# Patient Record
Sex: MALE | Race: BLACK OR AFRICAN AMERICAN | URBAN - METROPOLITAN AREA
[De-identification: names, ages, dates, MRNs, and addresses within clinical notes are randomized per-mention and may not be internally consistent; named-entity substitution may affect disease eponyms.]

---

## 2019-09-16 ENCOUNTER — ED HISTORICAL/CONVERTED ENCOUNTER (OUTPATIENT)
Dept: OTHER | Age: 64
End: 2019-09-16

## 2020-05-27 ENCOUNTER — OP HISTORICAL/CONVERTED ENCOUNTER (OUTPATIENT)
Dept: OTHER | Age: 65
End: 2020-05-27

## 2020-06-08 ENCOUNTER — OP HISTORICAL/CONVERTED ENCOUNTER (OUTPATIENT)
Dept: OTHER | Age: 65
End: 2020-06-08

## 2021-12-16 ENCOUNTER — HOSPITAL ENCOUNTER (EMERGENCY)
Age: 66
Discharge: HOME | End: 2021-12-16
Attending: FAMILY MEDICINE
Payer: MEDICARE

## 2021-12-16 ENCOUNTER — APPOINTMENT (OUTPATIENT)
Dept: CT IMAGING | Age: 66
End: 2021-12-16
Attending: FAMILY MEDICINE
Payer: MEDICARE

## 2021-12-16 VITALS
TEMPERATURE: 98.1 F | BODY MASS INDEX: 27.09 KG/M2 | WEIGHT: 200 LBS | HEART RATE: 77 BPM | OXYGEN SATURATION: 99 % | RESPIRATION RATE: 19 BRPM | SYSTOLIC BLOOD PRESSURE: 160 MMHG | DIASTOLIC BLOOD PRESSURE: 102 MMHG | HEIGHT: 72 IN

## 2021-12-16 DIAGNOSIS — R42 VERTIGO: Primary | ICD-10-CM

## 2021-12-16 LAB
ALBUMIN SERPL-MCNC: 3.3 G/DL (ref 3.5–5)
ALBUMIN/GLOB SERPL: 0.7 {RATIO} (ref 1.1–2.2)
ALP SERPL-CCNC: 78 U/L (ref 45–117)
ALT SERPL-CCNC: 19 U/L (ref 12–78)
ANION GAP SERPL CALC-SCNC: 7 MMOL/L (ref 5–15)
AST SERPL W P-5'-P-CCNC: 16 U/L (ref 15–37)
BASOPHILS # BLD: 0.1 K/UL (ref 0–0.1)
BASOPHILS NFR BLD: 1 % (ref 0–1)
BILIRUB SERPL-MCNC: 0.3 MG/DL (ref 0.2–1)
BUN SERPL-MCNC: 18 MG/DL (ref 6–20)
BUN/CREAT SERPL: 17 (ref 12–20)
CA-I BLD-MCNC: 9.2 MG/DL (ref 8.5–10.1)
CHLORIDE SERPL-SCNC: 103 MMOL/L (ref 97–108)
CO2 SERPL-SCNC: 28 MMOL/L (ref 21–32)
CREAT SERPL-MCNC: 1.06 MG/DL (ref 0.7–1.3)
DIFFERENTIAL METHOD BLD: NORMAL
EOSINOPHIL # BLD: 0.1 K/UL (ref 0–0.4)
EOSINOPHIL NFR BLD: 2 % (ref 0–7)
ERYTHROCYTE [DISTWIDTH] IN BLOOD BY AUTOMATED COUNT: 12.7 % (ref 11.5–14.5)
GLOBULIN SER CALC-MCNC: 4.9 G/DL (ref 2–4)
GLUCOSE SERPL-MCNC: 95 MG/DL (ref 65–100)
HCT VFR BLD AUTO: 39.6 % (ref 36.6–50.3)
HGB BLD-MCNC: 13.5 G/DL (ref 12.1–17)
IMM GRANULOCYTES # BLD AUTO: 0 K/UL (ref 0–0.04)
IMM GRANULOCYTES NFR BLD AUTO: 0 % (ref 0–0.5)
LYMPHOCYTES # BLD: 1.6 K/UL (ref 0.8–3.5)
LYMPHOCYTES NFR BLD: 28 % (ref 12–49)
MCH RBC QN AUTO: 31 PG (ref 26–34)
MCHC RBC AUTO-ENTMCNC: 34.1 G/DL (ref 30–36.5)
MCV RBC AUTO: 91 FL (ref 80–99)
MONOCYTES # BLD: 0.5 K/UL (ref 0–1)
MONOCYTES NFR BLD: 9 % (ref 5–13)
NEUTS SEG # BLD: 3.5 K/UL (ref 1.8–8)
NEUTS SEG NFR BLD: 60 % (ref 32–75)
PLATELET # BLD AUTO: 247 K/UL (ref 150–400)
PMV BLD AUTO: 9.5 FL (ref 8.9–12.9)
POTASSIUM SERPL-SCNC: 3.8 MMOL/L (ref 3.5–5.1)
PROT SERPL-MCNC: 8.2 G/DL (ref 6.4–8.2)
RBC # BLD AUTO: 4.35 M/UL (ref 4.1–5.7)
SODIUM SERPL-SCNC: 138 MMOL/L (ref 136–145)
WBC # BLD AUTO: 5.9 K/UL (ref 4.1–11.1)

## 2021-12-16 PROCEDURE — 36415 COLL VENOUS BLD VENIPUNCTURE: CPT

## 2021-12-16 PROCEDURE — 80053 COMPREHEN METABOLIC PANEL: CPT

## 2021-12-16 PROCEDURE — 85025 COMPLETE CBC W/AUTO DIFF WBC: CPT

## 2021-12-16 PROCEDURE — 99284 EMERGENCY DEPT VISIT MOD MDM: CPT

## 2021-12-16 PROCEDURE — 74011250636 HC RX REV CODE- 250/636: Performed by: FAMILY MEDICINE

## 2021-12-16 PROCEDURE — 74011000636 HC RX REV CODE- 636: Performed by: FAMILY MEDICINE

## 2021-12-16 PROCEDURE — 70496 CT ANGIOGRAPHY HEAD: CPT

## 2021-12-16 PROCEDURE — 93005 ELECTROCARDIOGRAM TRACING: CPT

## 2021-12-16 RX ORDER — MECLIZINE HYDROCHLORIDE 25 MG/1
25 TABLET ORAL 2 TIMES DAILY
Qty: 10 TABLET | Refills: 0 | Status: SHIPPED | OUTPATIENT
Start: 2021-12-16 | End: 2021-12-21

## 2021-12-16 RX ORDER — MECLIZINE HCL 12.5 MG 12.5 MG/1
25 TABLET ORAL
Status: COMPLETED | OUTPATIENT
Start: 2021-12-16 | End: 2021-12-16

## 2021-12-16 RX ADMIN — IOPAMIDOL 100 ML: 755 INJECTION, SOLUTION INTRAVENOUS at 17:42

## 2021-12-16 RX ADMIN — MECLIZINE 25 MG: 12.5 TABLET ORAL at 17:14

## 2021-12-16 NOTE — ED TRIAGE NOTES
Pt has had dizziness for 2 days with nausea that started today. Pt took garlic and tylenol this morning. Pt states he has issues with his sinuses that sometimes causes dizziness, states he has some sinus pressure.

## 2021-12-16 NOTE — DISCHARGE INSTRUCTIONS
Thank you! Thank you for allowing me to care for you in the emergency department. I sincerely hope that you are satisfied with your visit today. It is my goal to provide you with excellent care. Below you will find a list of your labs and imaging from your visit today. Should you have any questions regarding these results please do not hesitate to call the emergency department. Labs -     Recent Results (from the past 12 hour(s))   CBC WITH AUTOMATED DIFF    Collection Time: 12/16/21  5:00 PM   Result Value Ref Range    WBC 5.9 4.1 - 11.1 K/uL    RBC 4.35 4.10 - 5.70 M/uL    HGB 13.5 12.1 - 17.0 g/dL    HCT 39.6 36.6 - 50.3 %    MCV 91.0 80.0 - 99.0 FL    MCH 31.0 26.0 - 34.0 PG    MCHC 34.1 30.0 - 36.5 g/dL    RDW 12.7 11.5 - 14.5 %    PLATELET 954 503 - 345 K/uL    MPV 9.5 8.9 - 12.9 FL    NEUTROPHILS 60 32 - 75 %    LYMPHOCYTES 28 12 - 49 %    MONOCYTES 9 5 - 13 %    EOSINOPHILS 2 0 - 7 %    BASOPHILS 1 0 - 1 %    IMMATURE GRANULOCYTES 0 0.0 - 0.5 %    ABS. NEUTROPHILS 3.5 1.8 - 8.0 K/UL    ABS. LYMPHOCYTES 1.6 0.8 - 3.5 K/UL    ABS. MONOCYTES 0.5 0.0 - 1.0 K/UL    ABS. EOSINOPHILS 0.1 0.0 - 0.4 K/UL    ABS. BASOPHILS 0.1 0.0 - 0.1 K/UL    ABS. IMM. GRANS. 0.0 0.00 - 0.04 K/UL    DF AUTOMATED     METABOLIC PANEL, COMPREHENSIVE    Collection Time: 12/16/21  5:00 PM   Result Value Ref Range    Sodium 138 136 - 145 mmol/L    Potassium 3.8 3.5 - 5.1 mmol/L    Chloride 103 97 - 108 mmol/L    CO2 28 21 - 32 mmol/L    Anion gap 7 5 - 15 mmol/L    Glucose 95 65 - 100 mg/dL    BUN 18 6 - 20 mg/dL    Creatinine 1.06 0.70 - 1.30 mg/dL    BUN/Creatinine ratio 17 12 - 20      GFR est AA >60 >60 ml/min/1.73m2    GFR est non-AA >60 >60 ml/min/1.73m2    Calcium 9.2 8.5 - 10.1 mg/dL    Bilirubin, total 0.3 0.2 - 1.0 mg/dL    AST (SGOT) 16 15 - 37 U/L    ALT (SGPT) 19 12 - 78 U/L    Alk.  phosphatase 78 45 - 117 U/L    Protein, total 8.2 6.4 - 8.2 g/dL    Albumin 3.3 (L) 3.5 - 5.0 g/dL    Globulin 4.9 (H) 2.0 - 4.0 g/dL A-G Ratio 0.7 (L) 1.1 - 2.2         Radiologic Studies -   CTA HEAD NECK W CONT   Final Result   Normal CTA of the head and neck. Please note that degrees of carotid stenosis are measured in accordance with   NASCET criteria. CT Results  (Last 48 hours)                 12/16/21 1741  CTA HEAD NECK W CONT Final result    Impression:  Normal CTA of the head and neck. Please note that degrees of carotid stenosis are measured in accordance with   NASCET criteria. Narrative:  Study: Head and Neck CTA       Clinical Indication: Positional vertigo, multiple family members with aneurysms. Comparison: None available. Technique: Contiguous thin section axial acquisition of the head and neck was   performed in the arterial phase from the thoracic inlet to the skull vertex   following the intravenous administration of 100 mL Isovue-370. Coronal,   sagittal, and MIP reconstructions were generated and reviewed. Dose reduction:   All CT scans at this facility are performed using dose reduction optimization   techniques as appropriate to a performed exam including the following-automated   exposure control, adjustments of mA and/or Kv according to patient size, or use   of iterative reconstructive technique. Findings:       Neck CTA:   The aortic arch and great vessel origins are unremarkable. The common carotid arteries are patent without high-grade stenosis. The carotid   bifurcations are unremarkable. The cervical internal carotid arteries are patent   without high-grade stenosis based on NASCET criteria. The external carotid   arteries are unremarkable. The cervical vertebral arteries are patent without high-grade stenosis. No dissection or pseudoaneurysm. The paraspinal soft tissues are unremarkable. The lung apices are clear. No   destructive osseous lesion.        Head CTA:   The intracranial internal carotid arteries are patent without high-grade stenosis. The anterior cerebral arteries are patent without high-grade stenosis. The middle cerebral arteries are patent without high-grade stenosis. The   anterior and bilateral posterior communicating arteries are visualized. The intracranial vertebral arteries are patent without high-grade stenosis. The   basilar artery is patent without high-grade stenosis. The posterior cerebral   arteries are patent without high-grade stenosis. No aneurysm or high flow vascular malformation. CXR Results  (Last 48 hours)      None               If you feel that you have not received excellent quality care or timely care, please ask to speak to the nurse manager. Please choose us in the future for your continued health care needs. ------------------------------------------------------------------------------------------------------------  The exam and treatment you received in the Emergency Department were for an urgent problem and are not intended as complete care. It is important that you follow-up with a doctor, nurse practitioner, or physician assistant to:  (1) confirm your diagnosis,  (2) re-evaluation of changes in your illness and treatment, and  (3) for ongoing care. If your symptoms become worse or you do not improve as expected and you are unable to reach your usual health care provider, you should return to the Emergency Department. We are available 24 hours a day. Please take your discharge instructions with you when you go to your follow-up appointment. If you have any problem arranging a follow-up appointment, contact the Emergency Department immediately. If a prescription has been provided, please have it filled as soon as possible to prevent a delay in treatment. Read the entire medication instruction sheet provided to you by the pharmacy.  If you have any questions or reservations about taking the medication due to side effects or interactions with other medications, please call your primary care physician or contact the ER to speak with the charge nurse. Make an appointment with your family doctor or the physician you were referred to for follow-up of this visit as instructed on your discharge paperwork, as this is a mandatory follow-up. Return to the ER if you are unable to be seen or if you are unable to be seen in a timely manner. If you have any problem arranging the follow-up visit, contact the Emergency Department immediately.

## 2021-12-17 LAB
ATRIAL RATE: 67 BPM
CALCULATED P AXIS, ECG09: 57 DEGREES
CALCULATED R AXIS, ECG10: -42 DEGREES
CALCULATED T AXIS, ECG11: 41 DEGREES
DIAGNOSIS, 93000: NORMAL
P-R INTERVAL, ECG05: 184 MS
Q-T INTERVAL, ECG07: 386 MS
QRS DURATION, ECG06: 92 MS
QTC CALCULATION (BEZET), ECG08: 407 MS
VENTRICULAR RATE, ECG03: 67 BPM

## 2021-12-17 NOTE — ED PROVIDER NOTES
EMERGENCY DEPARTMENT HISTORY AND PHYSICAL EXAM      Date: 12/16/2021  Patient Name: Becca Leigh    History of Presenting Illness     Chief Complaint   Patient presents with    Dizziness    Nausea    Sinus Pain       History Provided By:     HPI: Becca Leigh, is an extremely pleasant 74 y.o. male presenting to the ED with a chief complaint of dizziness, nausea and sinus pain. Patient endorses a history of dizziness as well as sinus pressure in the past however over the last 2 days he states it is worse. He states when he moves his head to one direction or the other he develops a room spinning sensation. He denies any headaches. No neck pain or stiffness. No numbness, tingling or weakness in extremities. No fevers nor chills. Patient states his family has a history of brain aneurysms but he denies any history of such. No facial droop. No unilateral weakness. There are no other complaints, changes, or physical findings at this time. PCP: None    No current facility-administered medications on file prior to encounter. No current outpatient medications on file prior to encounter. Past History     Past Medical History:  Past Medical History:   Diagnosis Date    Sinus disease        Past Surgical History:  Past Surgical History:   Procedure Laterality Date    HX ORTHOPAEDIC Right     hip replacement       Family History:  History reviewed. No pertinent family history. Social History:  Social History     Tobacco Use    Smoking status: Former Smoker    Smokeless tobacco: Never Used   Substance Use Topics    Alcohol use: Not on file    Drug use: Not on file       Allergies: Allergies   Allergen Reactions    Penicillins Other (comments)     syncope         Review of Systems     Review of Systems   Constitutional: Negative for activity change, appetite change, chills, fatigue and fever. HENT: Negative for congestion and sore throat.     Eyes: Negative for photophobia and visual disturbance. Respiratory: Negative for cough, shortness of breath and wheezing. Cardiovascular: Negative for chest pain, palpitations and leg swelling. Gastrointestinal: Negative for abdominal pain, diarrhea, nausea and vomiting. Endocrine: Negative for cold intolerance and heat intolerance. Musculoskeletal: Negative for gait problem and joint swelling. Skin: Negative for color change and rash. Neurological: Negative for dizziness and headaches. Vertigo       Physical Exam     Physical Exam  Constitutional:       Appearance: Normal appearance. HENT:      Head: Normocephalic and atraumatic. Mouth/Throat:      Mouth: Mucous membranes are moist.      Pharynx: Oropharynx is clear. Eyes:      Extraocular Movements: Extraocular movements intact. Conjunctiva/sclera: Conjunctivae normal.   Neck:      Comments: No neck stiffness/rigidity, negative Brudzinski sign, negative Kernig sign    Cardiovascular:      Rate and Rhythm: Normal rate and regular rhythm. Heart sounds: Normal heart sounds. Pulmonary:      Effort: Pulmonary effort is normal. No respiratory distress. Breath sounds: Normal breath sounds. No wheezing, rhonchi or rales. Abdominal:      General: There is no distension. Palpations: Abdomen is soft. Tenderness: There is no abdominal tenderness. There is no guarding. Musculoskeletal:         General: No deformity. Cervical back: Normal range of motion and neck supple. Left lower leg: No edema. Skin:     Findings: No erythema or rash. Neurological:      General: No focal deficit present. Mental Status: He is alert and oriented to person, place, and time.       Gait: Gait normal.      Comments: No focal neurologic deficits, alert and oriented x4, cranial nerves II through XII grossly intact, no sensory deficits, no weakness in extremities, no pronator drift, no abnormal coordination, no abnormal gait, no abnormal deep tendon reflexes. No motor nor sensory deficits. No nystagmus. Psychiatric:         Mood and Affect: Mood normal.         Behavior: Behavior normal.         Lab and Diagnostic Study Results     Labs -   No results found for this or any previous visit (from the past 12 hour(s)). Radiologic Studies -   @lastxrresult@  CT Results  (Last 48 hours)               12/16/21 1741  CTA HEAD NECK W CONT Final result    Impression:  Normal CTA of the head and neck. Please note that degrees of carotid stenosis are measured in accordance with   NASCET criteria. Narrative:  Study: Head and Neck CTA       Clinical Indication: Positional vertigo, multiple family members with aneurysms. Comparison: None available. Technique: Contiguous thin section axial acquisition of the head and neck was   performed in the arterial phase from the thoracic inlet to the skull vertex   following the intravenous administration of 100 mL Isovue-370. Coronal,   sagittal, and MIP reconstructions were generated and reviewed. Dose reduction:   All CT scans at this facility are performed using dose reduction optimization   techniques as appropriate to a performed exam including the following-automated   exposure control, adjustments of mA and/or Kv according to patient size, or use   of iterative reconstructive technique. Findings:       Neck CTA:   The aortic arch and great vessel origins are unremarkable. The common carotid arteries are patent without high-grade stenosis. The carotid   bifurcations are unremarkable. The cervical internal carotid arteries are patent   without high-grade stenosis based on NASCET criteria. The external carotid   arteries are unremarkable. The cervical vertebral arteries are patent without high-grade stenosis. No dissection or pseudoaneurysm. The paraspinal soft tissues are unremarkable. The lung apices are clear. No   destructive osseous lesion.        Head CTA:   The intracranial internal carotid arteries are patent without high-grade   stenosis. The anterior cerebral arteries are patent without high-grade stenosis. The middle cerebral arteries are patent without high-grade stenosis. The   anterior and bilateral posterior communicating arteries are visualized. The intracranial vertebral arteries are patent without high-grade stenosis. The   basilar artery is patent without high-grade stenosis. The posterior cerebral   arteries are patent without high-grade stenosis. No aneurysm or high flow vascular malformation. CXR Results  (Last 48 hours)    None            Medical Decision Making   - I am the first provider for this patient. - I reviewed the vital signs, available nursing notes, past medical history, past surgical history, family history and social history. - Initial assessment performed. The patients presenting problems have been discussed, and they are in agreement with the care plan formulated and outlined with them. I have encouraged them to ask questions as they arise throughout their visit. Vital Signs-Reviewed the patient's vital signs. No data found. ED Course/ Provider Notes (Medical Decision Making):     Patient presented to the emergency department with a chief complaint of vertigo. On examination the patient is nontoxic and well-appearing. Vitals were reviewed per above. No focal neurologic deficits on examination. Symptoms appear more consistent with peripheral vertigo, more specifically benign paroxysmal positional vertigo. However given his history of many family members with brain aneurysms, CTA was ordered and demonstrates no acute abnormality. Laboratory work without marked abnormality. He is feeling significantly better after meclizine. Discussed with patient I would like him to follow-up with both ENT and neurology. Patient discharged home in stable and ambulatory condition.     David Azar was given a thorough list of signs and symptoms that would warrant an immediate return to the emergency department. Otherwise Twila Hook will follow up with PCP. Procedures   Medical Decision Makingedical Decision Making  Performed by: Katherine Zavala DO  Procedures  None       Disposition   Disposition:     Home    All of the diagnostic tests were reviewed and questions answered. Diagnosis, care plan and treatment options were discussed. The patient understands the instructions and will follow up as directed. The patients results have been reviewed with them. They have been counseled regarding their diagnosis. The patient verbally convey understanding and agreement of the signs, symptoms, diagnosis, treatment and prognosis and additionally agrees to follow up as recommended with their PCP in 24 - 48 hours. They also agree with the care-plan and convey that all of their questions have been answered. I have also put together some discharge instructions for them that include: 1) educational information regarding their diagnosis, 2) how to care for their diagnosis at home, as well a 3) list of reasons why they would want to return to the ED prior to their follow-up appointment, should their condition change. DISCHARGE PLAN:    1. Cannot display discharge medications since this patient is not currently admitted. 2.   Follow-up Information     Follow up With Specialties Details Why [address] [facility]  Call   [address] [address] [phone] [address] [facility] Neu[facility] Call   [address] [address] [phone]            3. Return to ED if worse       4. There are no discharge medications for this patient. Diagnosis     Clinical Impression:    1.  Vertigo        Attestations:    Katherine Zavala DO    Please note that this dictation was completed with Dragon, the computer voice recognition software. Quite often unanticipated grammatical, syntax, homophones, and other interpretive errors are inadvertently transcribed by the computer software. Please disregard these errors. Please excuse any errors that have escaped final proofreading. Thank you.